# Patient Record
Sex: MALE | Race: WHITE | NOT HISPANIC OR LATINO | Employment: OTHER | URBAN - METROPOLITAN AREA
[De-identification: names, ages, dates, MRNs, and addresses within clinical notes are randomized per-mention and may not be internally consistent; named-entity substitution may affect disease eponyms.]

---

## 2022-12-13 ENCOUNTER — APPOINTMENT (OUTPATIENT)
Dept: GENERAL RADIOLOGY | Facility: CLINIC | Age: 67
DRG: 200 | End: 2022-12-13
Attending: EMERGENCY MEDICINE
Payer: MEDICARE

## 2022-12-13 ENCOUNTER — APPOINTMENT (OUTPATIENT)
Dept: CT IMAGING | Facility: CLINIC | Age: 67
DRG: 200 | End: 2022-12-13
Attending: EMERGENCY MEDICINE
Payer: MEDICARE

## 2022-12-13 ENCOUNTER — HOSPITAL ENCOUNTER (INPATIENT)
Facility: CLINIC | Age: 67
LOS: 2 days | Discharge: HOME | DRG: 200 | End: 2022-12-15
Attending: EMERGENCY MEDICINE | Admitting: HOSPITALIST
Payer: MEDICARE

## 2022-12-13 DIAGNOSIS — S22.41XA CLOSED FRACTURE OF MULTIPLE RIBS OF RIGHT SIDE, INITIAL ENCOUNTER: ICD-10-CM

## 2022-12-13 DIAGNOSIS — J93.9 PNEUMOTHORAX, RIGHT: ICD-10-CM

## 2022-12-13 DIAGNOSIS — Z95.2 H/O MECHANICAL AORTIC VALVE REPLACEMENT: Primary | ICD-10-CM

## 2022-12-13 DIAGNOSIS — J93.9 PNEUMOTHORAX: ICD-10-CM

## 2022-12-13 LAB
ANION GAP SERPL CALCULATED.3IONS-SCNC: 5 MMOL/L (ref 3–14)
BASOPHILS # BLD AUTO: 0 10E3/UL (ref 0–0.2)
BASOPHILS NFR BLD AUTO: 0 %
BUN SERPL-MCNC: 20 MG/DL (ref 7–30)
CALCIUM SERPL-MCNC: 9.2 MG/DL (ref 8.5–10.1)
CHLORIDE BLD-SCNC: 104 MMOL/L (ref 94–109)
CO2 SERPL-SCNC: 27 MMOL/L (ref 20–32)
CREAT SERPL-MCNC: 1.22 MG/DL (ref 0.66–1.25)
EOSINOPHIL # BLD AUTO: 0 10E3/UL (ref 0–0.7)
EOSINOPHIL NFR BLD AUTO: 0 %
ERYTHROCYTE [DISTWIDTH] IN BLOOD BY AUTOMATED COUNT: 12.8 % (ref 10–15)
GFR SERPL CREATININE-BSD FRML MDRD: 65 ML/MIN/1.73M2
GLUCOSE BLD-MCNC: 118 MG/DL (ref 70–99)
GLUCOSE BLDC GLUCOMTR-MCNC: 139 MG/DL (ref 70–99)
HCT VFR BLD AUTO: 43.6 % (ref 40–53)
HGB BLD-MCNC: 14.6 G/DL (ref 13.3–17.7)
IMM GRANULOCYTES # BLD: 0.1 10E3/UL
IMM GRANULOCYTES NFR BLD: 1 %
INR PPP: 1.97 (ref 0.85–1.15)
LYMPHOCYTES # BLD AUTO: 0.7 10E3/UL (ref 0.8–5.3)
LYMPHOCYTES NFR BLD AUTO: 6 %
MCH RBC QN AUTO: 31 PG (ref 26.5–33)
MCHC RBC AUTO-ENTMCNC: 33.5 G/DL (ref 31.5–36.5)
MCV RBC AUTO: 93 FL (ref 78–100)
MONOCYTES # BLD AUTO: 0.6 10E3/UL (ref 0–1.3)
MONOCYTES NFR BLD AUTO: 6 %
NEUTROPHILS # BLD AUTO: 9.3 10E3/UL (ref 1.6–8.3)
NEUTROPHILS NFR BLD AUTO: 87 %
NRBC # BLD AUTO: 0 10E3/UL
NRBC BLD AUTO-RTO: 0 /100
PLATELET # BLD AUTO: 173 10E3/UL (ref 150–450)
POTASSIUM BLD-SCNC: 4.1 MMOL/L (ref 3.4–5.3)
RBC # BLD AUTO: 4.71 10E6/UL (ref 4.4–5.9)
SODIUM SERPL-SCNC: 136 MMOL/L (ref 133–144)
WBC # BLD AUTO: 10.8 10E3/UL (ref 4–11)

## 2022-12-13 PROCEDURE — 0W9930Z DRAINAGE OF RIGHT PLEURAL CAVITY WITH DRAINAGE DEVICE, PERCUTANEOUS APPROACH: ICD-10-PCS | Performed by: EMERGENCY MEDICINE

## 2022-12-13 PROCEDURE — 120N000013 HC R&B IMCU

## 2022-12-13 PROCEDURE — 80048 BASIC METABOLIC PNL TOTAL CA: CPT | Performed by: EMERGENCY MEDICINE

## 2022-12-13 PROCEDURE — 96375 TX/PRO/DX INJ NEW DRUG ADDON: CPT

## 2022-12-13 PROCEDURE — 36415 COLL VENOUS BLD VENIPUNCTURE: CPT | Performed by: EMERGENCY MEDICINE

## 2022-12-13 PROCEDURE — 120N000001 HC R&B MED SURG/OB

## 2022-12-13 PROCEDURE — 71260 CT THORAX DX C+: CPT

## 2022-12-13 PROCEDURE — 250N000011 HC RX IP 250 OP 636: Performed by: HOSPITALIST

## 2022-12-13 PROCEDURE — 71101 X-RAY EXAM UNILAT RIBS/CHEST: CPT | Mod: RT

## 2022-12-13 PROCEDURE — 250N000013 HC RX MED GY IP 250 OP 250 PS 637: Performed by: EMERGENCY MEDICINE

## 2022-12-13 PROCEDURE — 250N000009 HC RX 250: Performed by: EMERGENCY MEDICINE

## 2022-12-13 PROCEDURE — 85004 AUTOMATED DIFF WBC COUNT: CPT | Performed by: EMERGENCY MEDICINE

## 2022-12-13 PROCEDURE — 85610 PROTHROMBIN TIME: CPT | Performed by: EMERGENCY MEDICINE

## 2022-12-13 PROCEDURE — 250N000011 HC RX IP 250 OP 636: Performed by: EMERGENCY MEDICINE

## 2022-12-13 PROCEDURE — 32551 INSERTION OF CHEST TUBE: CPT

## 2022-12-13 PROCEDURE — 250N000013 HC RX MED GY IP 250 OP 250 PS 637: Performed by: HOSPITALIST

## 2022-12-13 PROCEDURE — 96374 THER/PROPH/DIAG INJ IV PUSH: CPT

## 2022-12-13 PROCEDURE — 99222 1ST HOSP IP/OBS MODERATE 55: CPT | Mod: AI | Performed by: HOSPITALIST

## 2022-12-13 PROCEDURE — 99285 EMERGENCY DEPT VISIT HI MDM: CPT | Mod: 25

## 2022-12-13 PROCEDURE — 999N000065 XR CHEST PORT 1 VIEW

## 2022-12-13 RX ORDER — NITROGLYCERIN 0.4 MG/1
0.4 TABLET SUBLINGUAL EVERY 5 MIN PRN
Status: DISCONTINUED | OUTPATIENT
Start: 2022-12-13 | End: 2022-12-15 | Stop reason: HOSPADM

## 2022-12-13 RX ORDER — LIDOCAINE 4 G/G
1 PATCH TOPICAL EVERY 24 HOURS
Status: DISCONTINUED | OUTPATIENT
Start: 2022-12-13 | End: 2022-12-15 | Stop reason: HOSPADM

## 2022-12-13 RX ORDER — ROSUVASTATIN CALCIUM 10 MG/1
10 TABLET, COATED ORAL DAILY
COMMUNITY

## 2022-12-13 RX ORDER — HYDROMORPHONE HYDROCHLORIDE 1 MG/ML
.3-.5 INJECTION, SOLUTION INTRAMUSCULAR; INTRAVENOUS; SUBCUTANEOUS EVERY 4 HOURS PRN
Status: DISCONTINUED | OUTPATIENT
Start: 2022-12-13 | End: 2022-12-15 | Stop reason: HOSPADM

## 2022-12-13 RX ORDER — IOPAMIDOL 755 MG/ML
80 INJECTION, SOLUTION INTRAVASCULAR ONCE
Status: COMPLETED | OUTPATIENT
Start: 2022-12-13 | End: 2022-12-13

## 2022-12-13 RX ORDER — LIDOCAINE 40 MG/G
CREAM TOPICAL
Status: DISCONTINUED | OUTPATIENT
Start: 2022-12-13 | End: 2022-12-13

## 2022-12-13 RX ORDER — LIDOCAINE 40 MG/G
CREAM TOPICAL
Status: DISCONTINUED | OUTPATIENT
Start: 2022-12-13 | End: 2022-12-15 | Stop reason: HOSPADM

## 2022-12-13 RX ORDER — LISINOPRIL 20 MG/1
20 TABLET ORAL DAILY
COMMUNITY

## 2022-12-13 RX ORDER — FENTANYL CITRATE 50 UG/ML
100 INJECTION, SOLUTION INTRAMUSCULAR; INTRAVENOUS ONCE
Status: COMPLETED | OUTPATIENT
Start: 2022-12-13 | End: 2022-12-13

## 2022-12-13 RX ORDER — OXYCODONE HYDROCHLORIDE 5 MG/1
10 TABLET ORAL EVERY 4 HOURS PRN
Status: DISCONTINUED | OUTPATIENT
Start: 2022-12-13 | End: 2022-12-15 | Stop reason: HOSPADM

## 2022-12-13 RX ORDER — FENTANYL CITRATE 50 UG/ML
INJECTION, SOLUTION INTRAMUSCULAR; INTRAVENOUS
Status: DISCONTINUED
Start: 2022-12-13 | End: 2022-12-13 | Stop reason: HOSPADM

## 2022-12-13 RX ORDER — FENTANYL CITRATE 50 UG/ML
INJECTION, SOLUTION INTRAMUSCULAR; INTRAVENOUS DAILY PRN
Status: COMPLETED | OUTPATIENT
Start: 2022-12-13 | End: 2022-12-13

## 2022-12-13 RX ORDER — GABAPENTIN 100 MG/1
100 CAPSULE ORAL 3 TIMES DAILY
Status: DISCONTINUED | OUTPATIENT
Start: 2022-12-13 | End: 2022-12-15 | Stop reason: HOSPADM

## 2022-12-13 RX ORDER — OXYCODONE HYDROCHLORIDE 5 MG/1
5 TABLET ORAL EVERY 4 HOURS PRN
Status: DISCONTINUED | OUTPATIENT
Start: 2022-12-13 | End: 2022-12-15 | Stop reason: HOSPADM

## 2022-12-13 RX ORDER — ROSUVASTATIN CALCIUM 10 MG/1
10 TABLET, COATED ORAL DAILY
Status: DISCONTINUED | OUTPATIENT
Start: 2022-12-14 | End: 2022-12-15 | Stop reason: HOSPADM

## 2022-12-13 RX ORDER — PANTOPRAZOLE SODIUM 40 MG/1
40 TABLET, DELAYED RELEASE ORAL DAILY
Status: DISCONTINUED | OUTPATIENT
Start: 2022-12-14 | End: 2022-12-15 | Stop reason: HOSPADM

## 2022-12-13 RX ORDER — OXYCODONE AND ACETAMINOPHEN 5; 325 MG/1; MG/1
2 TABLET ORAL ONCE
Status: COMPLETED | OUTPATIENT
Start: 2022-12-13 | End: 2022-12-13

## 2022-12-13 RX ORDER — METHOCARBAMOL 500 MG/1
500 TABLET, FILM COATED ORAL EVERY 6 HOURS PRN
Status: DISCONTINUED | OUTPATIENT
Start: 2022-12-13 | End: 2022-12-15 | Stop reason: HOSPADM

## 2022-12-13 RX ORDER — ACETAMINOPHEN 325 MG/1
975 TABLET ORAL EVERY 8 HOURS
Status: DISCONTINUED | OUTPATIENT
Start: 2022-12-13 | End: 2022-12-15 | Stop reason: HOSPADM

## 2022-12-13 RX ADMIN — ACETAMINOPHEN 975 MG: 325 TABLET, FILM COATED ORAL at 17:04

## 2022-12-13 RX ADMIN — HYDROMORPHONE HYDROCHLORIDE 0.5 MG: 1 INJECTION, SOLUTION INTRAMUSCULAR; INTRAVENOUS; SUBCUTANEOUS at 17:03

## 2022-12-13 RX ADMIN — FENTANYL CITRATE 100 MCG: 50 INJECTION, SOLUTION INTRAMUSCULAR; INTRAVENOUS at 13:31

## 2022-12-13 RX ADMIN — GABAPENTIN 100 MG: 100 CAPSULE ORAL at 20:13

## 2022-12-13 RX ADMIN — OXYCODONE HYDROCHLORIDE 10 MG: 5 TABLET ORAL at 21:11

## 2022-12-13 RX ADMIN — IOPAMIDOL 80 ML: 755 INJECTION, SOLUTION INTRAVENOUS at 13:55

## 2022-12-13 RX ADMIN — OXYCODONE HYDROCHLORIDE AND ACETAMINOPHEN 2 TABLET: 5; 325 TABLET ORAL at 15:46

## 2022-12-13 RX ADMIN — MIDAZOLAM HYDROCHLORIDE 1 MG: 1 INJECTION, SOLUTION INTRAMUSCULAR; INTRAVENOUS at 14:12

## 2022-12-13 RX ADMIN — FENTANYL CITRATE 50 MCG: 50 INJECTION, SOLUTION INTRAMUSCULAR; INTRAVENOUS at 14:28

## 2022-12-13 RX ADMIN — SODIUM CHLORIDE 80 ML: 900 INJECTION INTRAVENOUS at 13:55

## 2022-12-13 ASSESSMENT — ACTIVITIES OF DAILY LIVING (ADL)
ADLS_ACUITY_SCORE: 35

## 2022-12-13 ASSESSMENT — ENCOUNTER SYMPTOMS
ARTHRALGIAS: 1
SHORTNESS OF BREATH: 1

## 2022-12-13 NOTE — ED PROVIDER NOTES
"  History   Chief Complaint:  Fall       The history is provided by the patient.      Dutch Mary is a 67 year old male with a history of hypertension, s/p mechanical aortic valve replacement, on warfarin, who presents to the ED following a fall diving for a ball while playing pickle ball. Denies hitting his head or losing consciousness. He reports rib pain, shortness of breath, and soreness throughout his right side, exacerbated with movement.     Review of Systems   Respiratory: Positive for shortness of breath.    Musculoskeletal: Positive for arthralgias (right side).   All other systems reviewed and are negative.      Allergies:  The patient has no known allergies.     Medications:  Lovenox  Metoprolol  Prilosec  Simvastatin  Cialis  Warfarin  Crestor    Past Medical History:     Biceps tendinitis, left  Arthritis of shoulder, left  Depression  Hypertension  Greater trochanteric bursitis of left hip  Primary osteoarthritis of left hip  Mechanical ptosis of eyelid, bilateral  Dissection of aorta  Hyperlipidemia   Atrial fibrillation   Hypertensive kidney disease, stage III    Past Surgical History:    Aortic valve replacement  Colonoscopy  Tonsillectomy  Shoulder cyst surgery  Carpal tunnel release  Repair ptosis bilateral  Vasectomy  Nasal septum surgery     Family History:    Brother- diabetes mellitus, type II  Father- Heart disease  Mother- diabetes mellitus, type II    Social History:  The patient presents to the ED alone by foot.  PCP: Steve Parnell     Physical Exam     Patient Vitals for the past 24 hrs:   BP Temp Temp src Pulse Resp SpO2 Height Weight   12/15/22 0724 132/71 98.4  F (36.9  C) Oral 90 18 95 % -- --   12/15/22 0413 119/73 98.2  F (36.8  C) Oral 53 18 96 % -- 115.6 kg (254 lb 14.4 oz)   12/14/22 1936 121/72 97.8  F (36.6  C) Oral (!) 46 18 95 % -- --   12/14/22 1600 (!) 145/77 98.1  F (36.7  C) Oral 54 27 97 % 1.88 m (6' 2\") --   12/14/22 1200 118/69 -- -- 50 14 96 % -- -- "   12/14/22 1143 -- 97.5  F (36.4  C) Oral -- -- -- -- --   12/14/22 1000 106/58 -- -- 56 18 96 % -- --       Physical Exam  Gen: appears uncomfortable  Oral : Mucous membranes moist,   Nose: No rhinorhea  Ears: External near normal, without drainage  Eyes: periorbital tissues and sclera normal   Neck: supple, no abnormal swelling  Lungs: limited right lung respiratory capacity  CV: Regular rate, regular rhythm  Abd: soft, nontender, nondistended, no rebound/guarding  Ext: no lower extremity edema  Skin: warm, dry, well perfused, no rashes/bruising/lesions on exposed skin  Neuro: alert, no gross motor or sensory deficits,   Psych: pleasant mood, normal affect      Emergency Department Course   ECG  ECG results from 12/13/22   EKG 12-lead, tracing only     Value    Systolic Blood Pressure     Diastolic Blood Pressure     Ventricular Rate 46    Atrial Rate 46    IA Interval 170    QRS Duration 98        QTc 437    P Axis 35    R AXIS 44    T Axis 71    Interpretation ECG      Sinus bradycardia  Otherwise normal ECG  When compared with ECG of 28-NOV-2004 07:44,  No significant change was found         Imaging:  XR Chest 2 Views   Final Result   IMPRESSION: Persistent small right pneumothorax with maximal apical pleural separation of 2 cm. Mild right basilar atelectasis. Left lung clear. Borderline cardiomegaly. Poststernotomy and cardiac valve repair. Normal pulmonary vascularity.      CT Chest w/o Contrast   Final Result   IMPRESSION:    1.  Retraction of right chest tube outside of the pleural space, tip   now within the anterior chest wall.   2.  Recurrent small right pneumothorax without evidence of tension.   3.  Continued evolution of pulmonary contusions in the right upper and   middle lobes.   4.  Small pulmonary nodules, largest measuring 3 mm in the right upper   lobe. Again, recommend follow-up described below.      REFERENCE:   Guidelines for Management of Incidental Pulmonary Nodules Detected on   CT  Images: From the Fleischner Society 2017.    Guidelines apply to incidental nodules in patients who are 35 years or   older.   Guidelines do not apply to lung cancer screening, patients with   immunosuppression, or patients with known primary cancer.      MULTIPLE NODULES   Nodule size <6 mm   Low-risk patients: No follow-up needed.   High-risk patients: Optional follow-up at 12 months.      CHICHO TEJEDA MD            SYSTEM ID:  [id number]      XR Chest Port 1 View   Final Result   IMPRESSION: Recurrence of small right pneumothorax with maximal apical pleural separation of 2.4 cm. Lungs otherwise clear. Stable mild cardiomegaly. Normal pulmonary vascularity. Poststernotomy and cardiac valve repair.      XR Chest Port 1 View   Final Result   IMPRESSION: AP view of the chest was obtained. Postsurgical changes of   cardiac surgery with median sternotomy wires and surgical clips.   Interval placement of right chest tube with significant interval   decrease/almost complete resolution of the previously seen right   pneumothorax. No significant left pleural effusion or pneumothorax.      BRETT SOTO MD            SYSTEM ID:  [id number]      Chest CT w IV contrast only, TRAUMA / DISSECTION   Final Result   IMPRESSION:    1.  Similar volume of moderate right pneumothorax with likely   nondisplaced fractures of the lateral right fourth and fifth ribs.   Minimal leftward mediastinal shift, could indicate element of   early/mild tension.   2.  Likely small peripheral contusions in the right upper and middle   lobes.   3.  Incidental 3 mm right upper lobe pulmonary nodule. Consider   follow-up described below.      REFERENCE:   Guidelines for Management of Incidental Pulmonary Nodules Detected on   CT Images: From the Fleischner Society 2017.    Guidelines apply to incidental nodules in patients who are 35 years or   older.   Guidelines do not apply to lung cancer screening, patients with   immunosuppression, or patients  with known primary cancer.      SINGLE NODULE   Nodule size <6 mm   Low-risk patients: No follow-up needed.   High-risk patients: Optional follow-up at 12 months.      CHICHO TEJEDA MD            SYSTEM ID:  [id number]      Ribs XR, unilat 3 views + PA chest, right   Final Result   IMPRESSION:   1.  Moderate sized right pneumothorax.   2.  No fracture.   3.  No airspace consolidation or pleural effusion.   4.  Sternotomy with aortic valve prosthesis.      Findings discussed with Dr. Cobb by phone at 12:51pm.      CASSANDRA SOTO MD            SYSTEM ID:  [id number]        Report per radiology    Laboratory:  Labs Ordered and Resulted from Time of ED Arrival to Time of ED Departure   BASIC METABOLIC PANEL - Abnormal       Result Value    Sodium 136      Potassium 4.1      Chloride 104      Carbon Dioxide (CO2) 27      Anion Gap 5      Urea Nitrogen 20      Creatinine 1.22      Calcium 9.2      Glucose 118 (*)     GFR Estimate 65     INR - Abnormal    INR 1.97 (*)    CBC WITH PLATELETS AND DIFFERENTIAL - Abnormal    WBC Count 10.8      RBC Count 4.71      Hemoglobin 14.6      Hematocrit 43.6      MCV 93      MCH 31.0      MCHC 33.5      RDW 12.8      Platelet Count 173      % Neutrophils 87      % Lymphocytes 6      % Monocytes 6      % Eosinophils 0      % Basophils 0      % Immature Granulocytes 1      NRBCs per 100 WBC 0      Absolute Neutrophils 9.3 (*)     Absolute Lymphocytes 0.7 (*)     Absolute Monocytes 0.6      Absolute Eosinophils 0.0      Absolute Basophils 0.0      Absolute Immature Granulocytes 0.1      Absolute NRBCs 0.0          Procedures    Tube Thoracostomy Procedure Note     Procedure: Tube Thoracostomy    Indication: Pneumothorax     Laterality: Right    Consent: Verbal from Patient    Risks Discussed: Incorrect placement, bleeding, pain, infection, nerve damage, need for surgery and alternative treatment with no chest tube.    Universal Protocol: Universal protocol was followed and time out  "conducted just prior to starting procedure, confirming patient identity, site/side, procedure, patient position, and availability of correct equipment and implants.     Preparation: Povidone-iodine    Anesthesia/Sedation: Lidocaine - 1%    Procedure Detail:           Standard chest tube: \"Incision was made in the triangle of safety. Blunt dissection was performed up and over the rib and the pleural cavity was entered, confirmed by a rush of air and palpation of the lung surface. A 8 Thai chest tube was placed, fogging of tube observed. Tube was sutured in place and all connections banded.     Catheter was then connected to a one-way valve after manual aspiration of air/Pleur-evac at negative 20 cm H2O pressure.     Results:  There was no/minimal drainage.   Post procedure X-ray showing tube in acceptable position and re-expansion of the lung.     Patient Status: The patient tolerated the procedure well: Yes. There were no complications.    Emergency Department Course:       Reviewed:  I reviewed nursing notes, vitals, past medical history and Care Everywhere    Assessments:  1322 I obtained history and examined the patient as noted above.   1330 I rechecked the patient and explained findings.   1415 I performed the chest tube placement procedure as noted above.  1445 I rechecked the patient following the procedure.    Consults:  1522 I spoke with general surgery regarding the patient.  1525 I spoke with thoracic surgery regarding the patient.  1536 I consulted with Dr. Reed, hospitalist, regarding the patient, and they accepted the patient.    Interventions:  1331 Sublimaze 100 mcg IV    Disposition:  The patient was admitted to the hospital under the care of Dr. Reed.     Impression & Plan     Medical Decision Making:  Patient presents emergency department right-sided rib pain after fall while playing pickle ball today.  Patient feels a bit short of breath and is having pain lateral in his right " chest wall.  Reports prosthetic heart valve and he does take Coumadin.  Chest x-ray done while the patient was sitting in triage demonstrated a moderate sized pneumothorax.  I saw the patient shortly thereafter the x-ray resulted and he is having quite a lot of pain.  Was strongly suspicious of nondisplaced rib fractures given the amount of pain the patient was having.  He was not tachycardic, hypotensive or hypoxic so we did get a quick chest CT which demonstrated the pneumothorax and multiple nondisplaced rib fractures on the right side.  I was able to place an 8 Norwegian chest tube catheter, repeat x-ray does show improvement of the pneumothorax.  Catheter is currently on suction.  Contacted thoracic surgery who will follow up the chest tube placement.  Contacted hospitalist is in agreement for inpatient management.    Critical Care Time: was 35 minutes for this patient excluding procedures    Diagnosis:    ICD-10-CM    1. Closed fracture of multiple ribs of right side, initial encounter  S22.41XA       2. Pneumothorax, right  J93.9           Discharge Medications:  Current Discharge Medication List          Scribe Disclosure:  I, Cristin Kearney, am serving as a scribe at 1:18 PM on 12/13/2022 to document services personally performed by Saqib Marie, based on my observations and the provider's statements to me.          Saqib Marie MD  12/15/22 [id number]

## 2022-12-13 NOTE — H&P
[facility]    History and Physical - Hospitalist Service       Date of Admission:  12/13/2022    Assessment & Plan      Dutch Mary is a 67 year old male admitted on 12/13/2022.       Traumatic right pneumothorax  Nondisplaced fractures of the lateral right fourth and fifth ribs  Right upper and middle lobe lung contusions  Signs of minimal leftward mediastinal shift indicating potentially mild tension on CT prompted chest tube placement in the emergency department after consultation with  thoracic surgery  Plan  - Admit to inpatient to [facility] status given the Chest tube  - Continue chest tube to wall suction  - Consult thoracic surgery (Dr. Hawkins was consulted in the ED)  - Repeat chest x-ray in the morning  - continuous pulse ox  - Pain control per rib fracture order set including gabapentin, Tylenol, oxycodone/IV dilaudid, lidocaine patches      Past medical history of aortic dissection with mechanical aortic valve replacement  INR of 2.0 on admission  Plan  - Resume warfarin when safe from thoracic surgery standpoint  - Noted goal INR per outside records of 2.5-3.5  - Repeat INR daily    3 mm right upper lobe pulmonary nodule  - Refer to PCP for follow-up    Chronic medical conditions  GERD  Hyperlipidemia  Hypertension  Plan  - Resume medications as appropriate      Diet:  Regular  DVT Prophylaxis: holding warfarin (INR 2.0, received 10 mg of warfarin at home)  Cano Catheter: Not present  Central Lines: None  Cardiac Monitoring: None  Code Status:   full code    Clinically Significant Risk Factors Present on Admission               # Drug Induced Coagulation Defect: home medication list includes an anticoagulant medication                 Disposition Plan      Expected Discharge Date: 12/15/2022                The patient's care was discussed with the Patient.    Yovani Reed DO  Hospitalist Service  [facility]  Securely message with the [employer]  Web Console (learn more here)  Text page via [facility] Paging/Directory         ______________________________________________________________________    Chief Complaint   Chest pain    History is obtained from the patient    History of Present Illness   Dutch Mary is a 67 year old male with past medical history of mechanical aortic valve replacement, aortic dissection who presents with chest pain after diving during a game of [employer] ball.  He did not lose consciousness or hit his head.  He has pain in his chest and some shortness of breath.  Chest x-ray the emergency department revealed a moderate sized pneumothorax.  CT scan raise concern about small amount of mediastinal shift concerning for early tension.  Thoracic surgery was consulted and recommended chest tube placement.    Follow-up chest tube noted significant decrease/almost complete resolution of the previous seen right pneumothorax.    The patient currently notes increasing pain as the fentanyl from the chest tube insertion is wearing off.  He does not feel worse than previous.  He still feels somewhat short of breath but this is not worse.        Review of Systems    The 10 point Review of Systems is negative other than noted in the HPI or here.     Past Medical History    I have reviewed this patient's medical history and updated it with pertinent information if needed.   Past Medical History:   Diagnosis Date     Arrhythmia     atrail fib.     GERD (gastroesophageal reflux disease)      H/O vasectomy      Heart murmur     aortic valve replacement     Hyperlipidemia      Hypertension        Past Surgical History   I have reviewed this patient's surgical history and updated it with pertinent information if needed.  Past Surgical History:   Procedure Laterality Date     CARDIAC SURGERY      AVR     COLONOSCOPY       ENT SURGERY      tonsillectomy, nasal septal surgery     GENITOURINARY SURGERY       ORTHOPEDIC SURGERY      shoulder cyst, Carpal  tunnel release     REPAIR PTOSIS BILATERAL Bilateral 8/30/2016    Procedure: REPAIR PTOSIS BILATERAL;  Surgeon: Yovani Ornelas MD;  Location: [facility]     REPAIR PTOSIS BROW BILATERAL Bilateral 8/30/2016    Procedure: REPAIR PTOSIS BROW BILATERAL;  Surgeon: Yovani Ornelas MD;  Location: [facility]       Social History   I have reviewed this patient's social history and updated it with pertinent information if needed.  Social History     Tobacco Use     Smoking status: Former   Substance Use Topics     Alcohol use: Yes     Comment: 4/week     Drug use: No       Family History   I have reviewed this patient's family history and updated it with pertinent information if needed.  Family History   Problem Relation Age of Onset     Heart Failure Father        Prior to Admission Medications   Prior to Admission Medications   Prescriptions Last Dose Informant Patient Reported? Taking?   Enoxaparin Sodium (LOVENOX SC)   Yes No   METOPROLOL SUCCINATE ER PO   Yes No   Sig: Take 25 mg by mouth daily   Omeprazole Magnesium (PRILOSEC OTC PO)   Yes No   Sig: Take 20 mg by mouth daily   SIMVASTATIN PO   Yes No   Sig: Take 20 mg by mouth At Bedtime   Tadalafil (CIALIS PO)   Yes No   Sig: Take 20 mg by mouth daily as needed for erectile dysfunction   WARFARIN SODIUM PO   Yes No   Sig: Take 10 mg by mouth daily       Facility-Administered Medications: None     Allergies   No Known Allergies    Physical Exam   Vital Signs: Temp: 98  F (36.7  C) Temp src: Oral BP: 130/81 Pulse: 53   Resp: 20 SpO2: 100 % O2 Device: Nasal cannula Oxygen Delivery: 2 LPM  Weight: 250 lbs 0 oz    Gen: appears uncomfortable and in pain  Oral : Mucous membranes moist,   Nose: No rhinorhea  Ears: External near normal, without drainage  Eyes: periorbital tissues and sclera normal   Neck: supple, no abnormal swelling  Lungs: Right chest tube in place with air bubbles during inspiration. WOB WNL on 2 liters of NC. Lung CTAB  CV: Regular rate, regular rhythm.  Mechanical heart clic  Abd: soft, nontender, nondistended, no rebound/guarding  Ext: no lower extremity edema  Skin: warm, dry, well perfused, no rashes/bruising/lesions on exposed skin  Neuro: alert, no gross motor or sensory deficits,   Psych: pleasant mood, normal affect    Data   Data reviewed today: I reviewed all medications, new labs and imaging results over the last 24 hours. I personally reviewed   CT chest with moderate ptx questionable mediastinal shift. CXR with close to resolution of the PTX    Recent Labs   Lab 12/13/22  1447   WBC 10.8   HGB 14.6   MCV 93      INR 1.97*      POTASSIUM 4.1   CHLORIDE 104   CO2 27   BUN 20   CR 1.22   ANIONGAP 5   TANNER 9.2   *     Most Recent 3 CBC's:Recent Labs   Lab Test 12/13/22  1447   WBC 10.8   HGB 14.6   MCV 93        Most Recent 3 BMP's:Recent Labs   Lab Test 12/13/22  1447      POTASSIUM 4.1   CHLORIDE 104   CO2 27   BUN 20   CR 1.22   ANIONGAP 5   TANNER 9.2   *     Most Recent 2 LFT's:No lab results found.

## 2022-12-13 NOTE — ED NOTES
[facility]  ED Nurse Handoff Report    ED Chief complaint: Fall      ED Diagnosis:   Final diagnoses:   Closed fracture of multiple ribs of right side, initial encounter   Pneumothorax, right       Code Status: MD to Address    Allergies: No Known Allergies    Patient Story: Pt presents with rib pain and SOB following a fall during pickle ball. Pt has a hx of HTN and aortic valve replacement. Chest X-ray shows R pneumothorax. Chest tube placed.   Focused Assessment:  Aox4. VSS on 2L NC. Reg diet. Ambulate with assist.     Treatments and/or interventions provided: chest x-ray. Pain medications   Labs Ordered and Resulted from Time of ED Arrival to Time of ED Departure   BASIC METABOLIC PANEL - Abnormal       Result Value    Sodium 136      Potassium 4.1      Chloride 104      Carbon Dioxide (CO2) 27      Anion Gap 5      Urea Nitrogen 20      Creatinine 1.22      Calcium 9.2      Glucose 118 (*)     GFR Estimate 65     INR - Abnormal    INR 1.97 (*)    CBC WITH PLATELETS AND DIFFERENTIAL - Abnormal    WBC Count 10.8      RBC Count 4.71      Hemoglobin 14.6      Hematocrit 43.6      MCV 93      MCH 31.0      MCHC 33.5      RDW 12.8      Platelet Count 173      % Neutrophils 87      % Lymphocytes 6      % Monocytes 6      % Eosinophils 0      % Basophils 0      % Immature Granulocytes 1      NRBCs per 100 WBC 0      Absolute Neutrophils 9.3 (*)     Absolute Lymphocytes 0.7 (*)     Absolute Monocytes 0.6      Absolute Eosinophils 0.0      Absolute Basophils 0.0      Absolute Immature Granulocytes 0.1      Absolute NRBCs 0.0       Patient's response to treatments and/or interventions: improving     To be done/followed up on inpatient unit:  thoracic consult     Does this patient have any cognitive concerns?: aox4    Activity level - Baseline/Home:  Independent  Activity Level - Current:   Stand with Assist    Patient's Preferred language: English   Needed?: No    Isolation: None  Infection:  Not Applicable  Patient tested for COVID 19 prior to admission: NO  Bariatric?: No    Vital Signs:   Vitals:    12/13/22 1445 12/13/22 1446 12/13/22 1530 12/13/22 1545   BP: (!) 89/74 106/65 106/71 130/81   Pulse: 54 72 54 53   Resp:   19 20   Temp:       TempSrc:       SpO2:  98% 99% 100%   Weight:           Cardiac Rhythm:     Was the PSS-3 completed:   Yes  What interventions are required if any?               Family Comments: pt updating family   OBS brochure/video discussed/provided to patient/family: N/A              Name of person given brochure if not patient: n/a              Relationship to patient: n/a    For the majority of the shift this patient's behavior was Green.   Behavioral interventions performed were n/a.    ED NURSE PHONE NUMBER: [phone]

## 2022-12-13 NOTE — PHARMACY-ADMISSION MEDICATION HISTORY
Pharmacy Medication History  Admission medication history interview status for the 12/13/2022  admission is complete. See EPIC admission navigator for prior to admission medications     Location of Interview: Patient room  Medication history sources: Patient    Significant changes made to the medication list:  Removed: Metoprolol, enoxaparin, simvastatin  Added: Lisinopril, Crestor.    In the past week, patient estimated taking medication this percent of the time: greater than 90%    Additional medication history information:   None    Medication reconciliation completed by provider prior to medication history? No    Time spent in this activity: 15 min    Prior to Admission medications    Medication Sig Last Dose Taking? Auth Provider Long Term End Date   lisinopril (ZESTRIL) 20 MG tablet Take 20 mg by mouth daily 12/13/2022 Yes Unknown, Entered By History Yes    Omeprazole Magnesium (PRILOSEC OTC PO) Take 20 mg by mouth daily 12/13/2022 Yes Reported, Patient     rosuvastatin (CRESTOR) 10 MG tablet Take 10 mg by mouth daily 12/13/2022 Yes Unknown, Entered By History Yes    WARFARIN SODIUM PO Take by mouth See Admin Instructions 10mg daily except 15mg on Tues and Sat 12/13/2022 at 10mg Yes Reported, Patient     Tadalafil (CIALIS PO) Take 20 mg by mouth daily as needed for erectile dysfunction   Reported, Patient Yes        The information provided in this note is only as accurate as the sources available at the time of update(s)

## 2022-12-13 NOTE — ED TRIAGE NOTES
Patient fell while playing pickleball today and developed sudden rib pain and shortness of breath.      Triage Assessment     Row Name 12/13/22 1214       Triage Assessment (Adult)    Airway WDL WDL       Respiratory WDL    Respiratory WDL X       Skin Circulation/Temperature WDL    Skin Circulation/Temperature WDL WDL       Cardiac WDL    Cardiac WDL WDL       Peripheral/Neurovascular WDL    Peripheral Neurovascular WDL WDL       Cognitive/Neuro/Behavioral WDL    Cognitive/Neuro/Behavioral WDL WDL

## 2022-12-14 ENCOUNTER — APPOINTMENT (OUTPATIENT)
Dept: CT IMAGING | Facility: CLINIC | Age: 67
DRG: 200 | End: 2022-12-14
Attending: PHYSICIAN ASSISTANT
Payer: MEDICARE

## 2022-12-14 ENCOUNTER — APPOINTMENT (OUTPATIENT)
Dept: GENERAL RADIOLOGY | Facility: CLINIC | Age: 67
DRG: 200 | End: 2022-12-14
Attending: THORACIC SURGERY (CARDIOTHORACIC VASCULAR SURGERY)
Payer: MEDICARE

## 2022-12-14 LAB
ALBUMIN SERPL-MCNC: 3.4 G/DL (ref 3.4–5)
ALP SERPL-CCNC: 71 U/L (ref 40–150)
ALT SERPL W P-5'-P-CCNC: 40 U/L (ref 0–70)
ANION GAP SERPL CALCULATED.3IONS-SCNC: 6 MMOL/L (ref 3–14)
AST SERPL W P-5'-P-CCNC: 26 U/L (ref 0–45)
BASOPHILS # BLD AUTO: 0 10E3/UL (ref 0–0.2)
BASOPHILS NFR BLD AUTO: 1 %
BILIRUB SERPL-MCNC: 1.2 MG/DL (ref 0.2–1.3)
BUN SERPL-MCNC: 24 MG/DL (ref 7–30)
CALCIUM SERPL-MCNC: 8.7 MG/DL (ref 8.5–10.1)
CHLORIDE BLD-SCNC: 105 MMOL/L (ref 94–109)
CO2 SERPL-SCNC: 26 MMOL/L (ref 20–32)
CREAT SERPL-MCNC: 1.04 MG/DL (ref 0.66–1.25)
EOSINOPHIL # BLD AUTO: 0.2 10E3/UL (ref 0–0.7)
EOSINOPHIL NFR BLD AUTO: 3 %
ERYTHROCYTE [DISTWIDTH] IN BLOOD BY AUTOMATED COUNT: 13 % (ref 10–15)
GFR SERPL CREATININE-BSD FRML MDRD: 79 ML/MIN/1.73M2
GLUCOSE BLD-MCNC: 113 MG/DL (ref 70–99)
HCT VFR BLD AUTO: 40.4 % (ref 40–53)
HGB BLD-MCNC: 13.6 G/DL (ref 13.3–17.7)
IMM GRANULOCYTES # BLD: 0 10E3/UL
IMM GRANULOCYTES NFR BLD: 0 %
INR PPP: 2.09 (ref 0.85–1.15)
LYMPHOCYTES # BLD AUTO: 1.3 10E3/UL (ref 0.8–5.3)
LYMPHOCYTES NFR BLD AUTO: 20 %
MAGNESIUM SERPL-MCNC: 2.1 MG/DL (ref 1.6–2.3)
MCH RBC QN AUTO: 31 PG (ref 26.5–33)
MCHC RBC AUTO-ENTMCNC: 33.7 G/DL (ref 31.5–36.5)
MCV RBC AUTO: 92 FL (ref 78–100)
MONOCYTES # BLD AUTO: 0.7 10E3/UL (ref 0–1.3)
MONOCYTES NFR BLD AUTO: 11 %
NEUTROPHILS # BLD AUTO: 4.3 10E3/UL (ref 1.6–8.3)
NEUTROPHILS NFR BLD AUTO: 65 %
NRBC # BLD AUTO: 0 10E3/UL
NRBC BLD AUTO-RTO: 0 /100
PHOSPHATE SERPL-MCNC: 2.9 MG/DL (ref 2.5–4.5)
PLATELET # BLD AUTO: 157 10E3/UL (ref 150–450)
POTASSIUM BLD-SCNC: 3.6 MMOL/L (ref 3.4–5.3)
PROT SERPL-MCNC: 6.4 G/DL (ref 6.8–8.8)
RBC # BLD AUTO: 4.39 10E6/UL (ref 4.4–5.9)
SODIUM SERPL-SCNC: 137 MMOL/L (ref 133–144)
WBC # BLD AUTO: 6.6 10E3/UL (ref 4–11)

## 2022-12-14 PROCEDURE — 71250 CT THORAX DX C-: CPT | Mod: MG

## 2022-12-14 PROCEDURE — 85025 COMPLETE CBC W/AUTO DIFF WBC: CPT | Performed by: HOSPITALIST

## 2022-12-14 PROCEDURE — 85610 PROTHROMBIN TIME: CPT | Performed by: PHYSICIAN ASSISTANT

## 2022-12-14 PROCEDURE — 71045 X-RAY EXAM CHEST 1 VIEW: CPT

## 2022-12-14 PROCEDURE — 36415 COLL VENOUS BLD VENIPUNCTURE: CPT | Performed by: PHYSICIAN ASSISTANT

## 2022-12-14 PROCEDURE — 80053 COMPREHEN METABOLIC PANEL: CPT | Performed by: HOSPITALIST

## 2022-12-14 PROCEDURE — 93010 ELECTROCARDIOGRAM REPORT: CPT | Performed by: INTERNAL MEDICINE

## 2022-12-14 PROCEDURE — 250N000011 HC RX IP 250 OP 636: Performed by: PHYSICIAN ASSISTANT

## 2022-12-14 PROCEDURE — 99233 SBSQ HOSP IP/OBS HIGH 50: CPT | Performed by: PHYSICIAN ASSISTANT

## 2022-12-14 PROCEDURE — 250N000013 HC RX MED GY IP 250 OP 250 PS 637: Performed by: HOSPITALIST

## 2022-12-14 PROCEDURE — 93005 ELECTROCARDIOGRAM TRACING: CPT

## 2022-12-14 PROCEDURE — G1010 CDSM STANSON: HCPCS

## 2022-12-14 PROCEDURE — 36415 COLL VENOUS BLD VENIPUNCTURE: CPT | Performed by: HOSPITALIST

## 2022-12-14 PROCEDURE — 250N000009 HC RX 250: Performed by: PHYSICIAN ASSISTANT

## 2022-12-14 PROCEDURE — 84100 ASSAY OF PHOSPHORUS: CPT | Performed by: HOSPITALIST

## 2022-12-14 PROCEDURE — 250N000011 HC RX IP 250 OP 636: Performed by: HOSPITALIST

## 2022-12-14 PROCEDURE — 83735 ASSAY OF MAGNESIUM: CPT | Performed by: HOSPITALIST

## 2022-12-14 PROCEDURE — 120N000013 HC R&B IMCU

## 2022-12-14 PROCEDURE — 120N000001 HC R&B MED SURG/OB

## 2022-12-14 RX ORDER — NALOXONE HYDROCHLORIDE 0.4 MG/ML
0.2 INJECTION, SOLUTION INTRAMUSCULAR; INTRAVENOUS; SUBCUTANEOUS
Status: DISCONTINUED | OUTPATIENT
Start: 2022-12-14 | End: 2022-12-15 | Stop reason: HOSPADM

## 2022-12-14 RX ORDER — NALOXONE HYDROCHLORIDE 0.4 MG/ML
0.4 INJECTION, SOLUTION INTRAMUSCULAR; INTRAVENOUS; SUBCUTANEOUS
Status: DISCONTINUED | OUTPATIENT
Start: 2022-12-14 | End: 2022-12-15 | Stop reason: HOSPADM

## 2022-12-14 RX ORDER — WARFARIN SODIUM 7.5 MG/1
15 TABLET ORAL
Status: COMPLETED | OUTPATIENT
Start: 2022-12-14 | End: 2022-12-14

## 2022-12-14 RX ORDER — GINSENG 100 MG
CAPSULE ORAL ONCE
Status: COMPLETED | OUTPATIENT
Start: 2022-12-14 | End: 2022-12-14

## 2022-12-14 RX ORDER — DOCUSATE SODIUM 100 MG/1
100 CAPSULE, LIQUID FILLED ORAL 2 TIMES DAILY PRN
Status: DISCONTINUED | OUTPATIENT
Start: 2022-12-14 | End: 2022-12-15 | Stop reason: HOSPADM

## 2022-12-14 RX ORDER — ENOXAPARIN SODIUM 150 MG/ML
1 INJECTION SUBCUTANEOUS EVERY 12 HOURS
Status: DISCONTINUED | OUTPATIENT
Start: 2022-12-14 | End: 2022-12-15 | Stop reason: HOSPADM

## 2022-12-14 RX ADMIN — ROSUVASTATIN CALCIUM 10 MG: 10 TABLET, FILM COATED ORAL at 08:44

## 2022-12-14 RX ADMIN — PANTOPRAZOLE SODIUM 40 MG: 40 TABLET, DELAYED RELEASE ORAL at 08:44

## 2022-12-14 RX ADMIN — WARFARIN SODIUM 15 MG: 7.5 TABLET ORAL at 17:48

## 2022-12-14 RX ADMIN — ACETAMINOPHEN 975 MG: 325 TABLET, FILM COATED ORAL at 00:23

## 2022-12-14 RX ADMIN — GABAPENTIN 100 MG: 100 CAPSULE ORAL at 19:18

## 2022-12-14 RX ADMIN — OXYCODONE HYDROCHLORIDE 10 MG: 5 TABLET ORAL at 17:54

## 2022-12-14 RX ADMIN — GABAPENTIN 100 MG: 100 CAPSULE ORAL at 08:44

## 2022-12-14 RX ADMIN — OXYCODONE HYDROCHLORIDE 10 MG: 5 TABLET ORAL at 23:43

## 2022-12-14 RX ADMIN — BACITRACIN: 500 OINTMENT TOPICAL at 15:58

## 2022-12-14 RX ADMIN — ACETAMINOPHEN 975 MG: 325 TABLET, FILM COATED ORAL at 17:48

## 2022-12-14 RX ADMIN — LIDOCAINE 1 PATCH: 560 PATCH PERCUTANEOUS; TOPICAL; TRANSDERMAL at 17:50

## 2022-12-14 RX ADMIN — OXYCODONE HYDROCHLORIDE 10 MG: 5 TABLET ORAL at 10:26

## 2022-12-14 RX ADMIN — ENOXAPARIN SODIUM 120 MG: 120 INJECTION SUBCUTANEOUS at 19:18

## 2022-12-14 RX ADMIN — OXYCODONE HYDROCHLORIDE 5 MG: 5 TABLET ORAL at 05:19

## 2022-12-14 RX ADMIN — GABAPENTIN 100 MG: 100 CAPSULE ORAL at 13:33

## 2022-12-14 RX ADMIN — ACETAMINOPHEN 975 MG: 325 TABLET, FILM COATED ORAL at 08:44

## 2022-12-14 RX ADMIN — HYDROMORPHONE HYDROCHLORIDE 0.5 MG: 1 INJECTION, SOLUTION INTRAMUSCULAR; INTRAVENOUS; SUBCUTANEOUS at 13:33

## 2022-12-14 ASSESSMENT — ACTIVITIES OF DAILY LIVING (ADL)
ADLS_ACUITY_SCORE: 35

## 2022-12-14 NOTE — PLAN OF CARE
Goal Outcome Evaluation:      Plan of Care Reviewed With: patient    Overall Patient Progress: improvingOverall Patient Progress: improving    IMC discontinued. VSS, bradycardic. Chest tube was removed this afternoon by thoracic PA. Dressing CDI. IS 2500ml. Up independently in room. Pain managed with PRN oxycodone. Warfarin restarted, lovenox to bridge since subtherapeutic. Plan for CXR in AM and then possible discharge tomorrow. CTM.

## 2022-12-14 NOTE — PROVIDER NOTIFICATION
MD Notification    Notified Person: MD    Notified Person Name: Valentino Live    Notification Date/Time: 12/14/22 0010    Notification Interaction: text page    Purpose of Notification:Pt just transferred to floor,now has a small continuous air leak in chest tube-has slightly decreased since starting.No increase in SOB. No crepitus or chest pain.All tubes connected to suction    Orders Received:    Comments: defer to thoracic team. Charge RN notified, ok to monitor overnight-no change in RR, SOB or pain. Air leak bubbles starting to slow down. Chest tube banded along connection sections.

## 2022-12-14 NOTE — PROGRESS NOTES
THORACIC SURGERY PROGRESS NOTE    Reviewed CT scan. Chest tube pulled out of the pleural space. Small right pneumothorax.     Removed CT at the bedside, occlusive dressing applied.     Plan to observe patient overnight. Will order CXR PA/lat in AM. If okay, then discharge to home and follow up in clinic next Monday.    Ruth Ann Bassett PA-C with Dr. Kiel Hawkins  [facility]  Office: [phone]  Cell: [phone]

## 2022-12-14 NOTE — PLAN OF CARE
Goal Outcome Evaluation:         Up to floor at 2340. Alert and oriented x4. Vital signs stable on RA ex bradycardic. Assist of 1, pivot transfer. Tolerating regular diet. Lung sounds dim, clear. - flatus, BM -. Adequate urine output. Sun burn throughout body. CT to R side, CDI, to -20 suction. Continuous airleak noted, no increase in SOB, chest pain or O2 requirements. No crepitus noted. Pain managed with oxycodone and scheduled tylenol. Denies nausea. Tele SB.

## 2022-12-14 NOTE — PROGRESS NOTES
[facility]    Hospitalist Progress Note      Assessment & Plan    Dutch Mary is a 67 year old male with a past medical history significant for mechanical aortic valve replacement, HTN, HLD, and aortic dissection admitted on 12/13/2022 after presenting with chest pain and shortness of breath following an injury while playing pickle ball.     Traumatic right pneumothorax  Nondisplaced fractures of the lateral right fourth and fifth ribs  Right upper and middle lobe lung contusions  Signs of minimal leftward mediastinal shift indicating potentially mild tension on CT prompted chest tube placement in the emergency department after consultation with  thoracic surgery  Chest tube placed in the ED  * CXR 12/14 raises some concern about chest tube placement  - Continue chest tube to wall suction  - Thoracic surgery following, appreciate assistance  -- plan for CT chest this afternoon to confirm placement, may need IR intervention if dislodged (IR aware)  - continuous pulse ox  - Pain control per rib fracture order set including gabapentin, Tylenol, oxycodone/IV dilaudid, lidocaine patches  --aggressive IS, pulmonary hygiene      Mechanical aortic valve replacement  Hx aortic dissection    INR on admission 1.97, 2.0 12/14.   * goal INR per outside records of 2.5-3.5  --plan to initiate bridge following CT results/plan this afternoon. Awaiting thoracic surgery preference in regards to lovenox vs heparin and timing of initiation  --continue to hold warfarin for now, appreciate thoracic input   --daily INR      3 mm right upper lobe pulmonary nodule  - Refer to PCP for follow-up     Chronic medical conditions  GERD  Hyperlipidemia  Hypertension  Plan  - Resume PTA statin, PPI  --resume PTA lisinopril in the am with hold parameters pending BP    DVT Prophylaxis: Pneumatic Compression Devices, will need bridge pending CT findings as above   Code Status: Full Code    Disposition: Expected  discharge in 2 days pending clinical course    Patient was discussed with Dr. Brown who agrees with the above plan     Mary Collazo PA-C, [facility]    Interval History   Feeling better today. TPain controlled with medications and more localized to tube insertion site; no longer having pain with deep inspiration. Denies dyspnea. Still very uncomfortable with significant movement.     -Data reviewed today: I reviewed all new labs and imaging results over the last 24 hours. I personally reviewed no images or EKG's today.    Physical Exam   Temp: 98.2  F (36.8  C) Temp src: Oral BP: 127/76 Pulse: (!) 46   Resp: 17 SpO2: 98 % O2 Device: None (Room air) Oxygen Delivery: 2 LPM  Vitals:    12/13/22 1218 12/14/22 0500   Weight: 113.4 kg (250 lb) 115.3 kg (254 lb 3.2 oz)     Vital Signs with Ranges  Temp:  [97.3  F (36.3  C)-98.2  F (36.8  C)] 98.2  F (36.8  C)  Pulse:  [46-72] 46  Resp:  [11-31] 17  BP: ()/(59-95) 127/76  SpO2:  [94 %-100 %] 98 %  I/O last 3 completed shifts:  In: 240 [P.O.:240]  Out: 300 [Urine:300]    Constitutional: Alert and oriented, sitting up in bed. Appears comfortable and is appropriately conversant   ENT: moist mucous membranes. Trachea midline  Respiratory: Lungs diminished right base otherwise clear without increased work of breathing. CT in place on right   Cardiovascular: Regular rate and rhythm, mechanical valve click  GI: active bowel sounds, abdomen soft, non-tender  Skin/Integumen: no ecchymosis seen  MSK: moves all four extremities. Normal tone   Neuro:  Speech is clear. Face symmetric.follows commands     Medications       acetaminophen  975 mg Oral Q8H     gabapentin  100 mg Oral TID     lidocaine  1 patch Transdermal Q24H     lidocaine   Transdermal Q8H GAEL     pantoprazole  40 mg Oral Daily     rosuvastatin  10 mg Oral Daily     sodium chloride (PF)  3 mL Intracatheter Q8H       Data   Recent Labs   Lab 12/14/22  0535 12/13/22  2348 12/13/22  1447   WBC 6.6  --  10.8   HGB  13.6  --  14.6   MCV 92  --  93     --  173   INR  --   --  1.97*     --  136   POTASSIUM 3.6  --  4.1   CHLORIDE 105  --  104   CO2 26  --  27   BUN 24  --  20   CR 1.04  --  1.22   ANIONGAP 6  --  5   TANNER 8.7  --  9.2   * 139* 118*   ALBUMIN 3.4  --   --    PROTTOTAL 6.4*  --   --    BILITOTAL 1.2  --   --    ALKPHOS 71  --   --    ALT 40  --   --    AST 26  --   --        Recent Results (from the past 24 hour(s))   Ribs XR, unilat 3 views + PA chest, right    Narrative    RIBS AND CHEST RIGHT THREE VIEWS December 13, 2022 12:33 PM     INDICATION: Right-sided rib pain after a fall.     COMPARISON: 11/29/2004.      Impression    IMPRESSION:  1.  Moderate sized right pneumothorax.  2.  No fracture.  3.  No airspace consolidation or pleural effusion.  4.  Sternotomy with aortic valve prosthesis.    Findings discussed with Dr. Cobb by phone at 12:51pm.    CASSANDRA SOTO MD         SYSTEM ID:  [id number]   Chest CT w IV contrast only, TRAUMA / DISSECTION    Narrative    CT CHEST W CONTRAST 12/13/2022 2:10 PM    CLINICAL HISTORY: eval for rib fx, known pneumo  TECHNIQUE: CT chest with IV contrast. Multiplanar reformats were  obtained. Dose reduction techniques were used.    CONTRAST: 80mL Isovue-370    COMPARISON: Same-day chest radiograph.    FINDINGS:   LUNGS AND PLEURA: Grossly unchanged size of known moderate right  pneumothorax. Small peripheral contusions in the right lower and  middle lobes (for example series 4 images 104 and 163). No significant  pleural effusion. Left lung is clear. Incidental 3 mm nodule in the  right upper lobe (series 4 image 101).    MEDIASTINUM/AXILLAE: No lymphadenopathy. No thoracic aneurysm. Prior  aortic valve replacement. Possible minimal leftward mediastinal shift.      CORONARY ARTERY CALCIFICATION: Mild.    UPPER ABDOMEN: Cholelithiasis without evidence of acute cholecystitis.    MUSCULOSKELETAL: Likely nondisplaced fractures of the lateral right  fourth and  fifth ribs. Healed left sixth rib fracture. Prior median  sternotomy.        Impression    IMPRESSION:   1.  Similar volume of moderate right pneumothorax with likely  nondisplaced fractures of the lateral right fourth and fifth ribs.  Minimal leftward mediastinal shift, could indicate element of  early/mild tension.  2.  Likely small peripheral contusions in the right upper and middle  lobes.  3.  Incidental 3 mm right upper lobe pulmonary nodule. Consider  follow-up described below.    REFERENCE:  Guidelines for Management of Incidental Pulmonary Nodules Detected on  CT Images: From the Fleischner Society 2017.   Guidelines apply to incidental nodules in patients who are 35 years or  older.  Guidelines do not apply to lung cancer screening, patients with  immunosuppression, or patients with known primary cancer.    SINGLE NODULE  Nodule size <6 mm  Low-risk patients: No follow-up needed.  High-risk patients: Optional follow-up at 12 months.    CHICHO TEJEDA MD         SYSTEM ID:  [id number]   XR Chest Port 1 View    Narrative    CHEST PORTABLE 1 VIEW   12/13/2022 2:48 PM     HISTORY: Evaluate CT.    COMPARISON: Chest CT on same day earlier.      Impression    IMPRESSION: AP view of the chest was obtained. Postsurgical changes of  cardiac surgery with median sternotomy wires and surgical clips.  Interval placement of right chest tube with significant interval  decrease/almost complete resolution of the previously seen right  pneumothorax. No significant left pleural effusion or pneumothorax.    BRETT SOTO MD         SYSTEM ID:  [id number]   XR Chest Port 1 View    Narrative    EXAM: XR CHEST PORT 1 VIEW  LOCATION: [facility]  DATE/TIME: 12/14/2022 6:05 AM    INDICATION: Surveillance of pneumothorax.  COMPARISON: 12/13/2022.      Impression    IMPRESSION: Recurrence of small right pneumothorax with maximal apical pleural separation of 2.4 cm. Lungs otherwise clear. Stable mild  cardiomegaly. Normal pulmonary vascularity. Poststernotomy and cardiac valve repair.

## 2022-12-14 NOTE — PHARMACY-ANTICOAGULATION SERVICE
Clinical Pharmacy - Warfarin Dosing Consult     Pharmacy has been consulted to manage this patient s warfarin therapy.  Indication: Mechanical Aortic Valve Replacement  Therapy Goal: INR 2.5-3.5  Warfarin Prior to Admission: Yes  Warfarin PTA Regimen: 15 mg Tue/Sat, 10 mg rest of the week    INR   Date Value Ref Range Status   12/14/2022 2.09 (H) 0.85 - 1.15 Final   12/13/2022 1.97 (H) 0.85 - 1.15 Final       Recommend warfarin 15 mg today.  Pharmacy will monitor Dutch Mary daily and order warfarin doses to achieve specified goal.      Please contact pharmacy as soon as possible if the warfarin needs to be held for a procedure or if the warfarin goals change.

## 2022-12-14 NOTE — CONSULTS
THORACIC SURGERY CONSULTATION    Dutch Mary  1955   [id number]    Date of Admission: 12/13/2022  1:15 PM  Date of Consult: 12/14/2022     Referring Provider: Dr. Yovani Reed  Consulting Thoracic Surgeon: Dr. Kiel Hawkins    CC: Pneumothorax, right [J93.9]  Closed fracture of multiple ribs of right side, initial encounter [S22.41XA]    History of the Present Illness: Dutch Mary is a 67-year-old male with history of hypertension, hyperlipidemia aortic dissection s/p aortic valve replacement and GERD. He was playing [employer] ball yesterday and fell while diving for the ball. He developed chest pain and shortness of breath. He presented to the ED yesterday evening. There, CXR demonstrated moderated sized right pneumothorax. CT scan of the chest again demonstrated right pneumothorax and concern for mediastinal shift. He underwent chest tube placement in the ED. CXR post CT placement demonstrated good improvement of the right pneumothorax. Thoracic surgery consulted for management of chest tube and right pneumothorax.    Today, he reports his breathing is improved and chest pain localized to the chest tube insertion site. He is on room air. This is the first occurrence of a pneumothorax.       ROS: A 12-point review of systems was performed and negative except as noted in the HPI above.    Past Medical History:  Past Medical History:   Diagnosis Date     Arrhythmia     atrail fib.     GERD (gastroesophageal reflux disease)      H/O vasectomy      Heart murmur     aortic valve replacement     Hyperlipidemia      Hypertension         Past Surgical History:  Past Surgical History:   Procedure Laterality Date     CARDIAC SURGERY      AVR     COLONOSCOPY       ENT SURGERY      tonsillectomy, nasal septal surgery     GENITOURINARY SURGERY       ORTHOPEDIC SURGERY      shoulder cyst, Carpal tunnel release     REPAIR PTOSIS BILATERAL Bilateral 8/30/2016    Procedure: REPAIR PTOSIS BILATERAL;  Surgeon: Johnson  Yovani Ta MD;  Location: [facility]     REPAIR PTOSIS BROW BILATERAL Bilateral 8/30/2016    Procedure: REPAIR PTOSIS BROW BILATERAL;  Surgeon: Yovani Ornelas MD;  Location: [facility]       Family History:  Family History   Problem Relation Age of Onset     Heart Failure Father        Medications:  No current outpatient medications on file.       Physical Exam:   General:  Dutch is awake, alert, and cooperative.  NAD.  /69   Pulse 50   Temp 97.5  F (36.4  C) (Oral)   Resp 14   Wt 115.3 kg (254 lb 3.2 oz)   SpO2 96%   BMI 31.77 kg/m     HEENT: Grossly normal, trachea midline, EOM's intact  Respiratory: Regular, no distress, on room air.  Cardiac: Regular rate and rhythm  Abdomen: Soft, non-tender  Extremities: Warm, well perfused  Neuro: No focal deficits    Chest Tube Site: Heimlich valve removed and replaced with a new connector piece. No air leak after changing connections. Dressing around chest tube taken down and chest tube well secured.     Imaging:  Recent Results (from the past 48 hour(s))   Ribs XR, unilat 3 views + PA chest, right    Narrative    RIBS AND CHEST RIGHT THREE VIEWS December 13, 2022 12:33 PM     INDICATION: Right-sided rib pain after a fall.     COMPARISON: 11/29/2004.      Impression    IMPRESSION:  1.  Moderate sized right pneumothorax.  2.  No fracture.  3.  No airspace consolidation or pleural effusion.  4.  Sternotomy with aortic valve prosthesis.    Findings discussed with Dr. Cobb by phone at 12:51pm.    CASSANDRA SOTO MD         SYSTEM ID:  [id number]   Chest CT w IV contrast only, TRAUMA / DISSECTION    Narrative    CT CHEST W CONTRAST 12/13/2022 2:10 PM    CLINICAL HISTORY: eval for rib fx, known pneumo  TECHNIQUE: CT chest with IV contrast. Multiplanar reformats were  obtained. Dose reduction techniques were used.    CONTRAST: 80mL Isovue-370    COMPARISON: Same-day chest radiograph.    FINDINGS:   LUNGS AND PLEURA: Grossly unchanged size of known moderate  right  pneumothorax. Small peripheral contusions in the right lower and  middle lobes (for example series 4 images 104 and 163). No significant  pleural effusion. Left lung is clear. Incidental 3 mm nodule in the  right upper lobe (series 4 image 101).    MEDIASTINUM/AXILLAE: No lymphadenopathy. No thoracic aneurysm. Prior  aortic valve replacement. Possible minimal leftward mediastinal shift.      CORONARY ARTERY CALCIFICATION: Mild.    UPPER ABDOMEN: Cholelithiasis without evidence of acute cholecystitis.    MUSCULOSKELETAL: Likely nondisplaced fractures of the lateral right  fourth and fifth ribs. Healed left sixth rib fracture. Prior median  sternotomy.        Impression    IMPRESSION:   1.  Similar volume of moderate right pneumothorax with likely  nondisplaced fractures of the lateral right fourth and fifth ribs.  Minimal leftward mediastinal shift, could indicate element of  early/mild tension.  2.  Likely small peripheral contusions in the right upper and middle  lobes.  3.  Incidental 3 mm right upper lobe pulmonary nodule. Consider  follow-up described below.    REFERENCE:  Guidelines for Management of Incidental Pulmonary Nodules Detected on  CT Images: From the Fleischner Society 2017.   Guidelines apply to incidental nodules in patients who are 35 years or  older.  Guidelines do not apply to lung cancer screening, patients with  immunosuppression, or patients with known primary cancer.    SINGLE NODULE  Nodule size <6 mm  Low-risk patients: No follow-up needed.  High-risk patients: Optional follow-up at 12 months.    CHICHO TEJEDA MD         SYSTEM ID:  [id number]   XR Chest Port 1 View    Narrative    CHEST PORTABLE 1 VIEW   12/13/2022 2:48 PM     HISTORY: Evaluate CT.    COMPARISON: Chest CT on same day earlier.      Impression    IMPRESSION: AP view of the chest was obtained. Postsurgical changes of  cardiac surgery with median sternotomy wires and surgical clips.  Interval placement of right chest  tube with significant interval  decrease/almost complete resolution of the previously seen right  pneumothorax. No significant left pleural effusion or pneumothorax.    BRETT SOTO MD         SYSTEM ID:  [id number]   XR Chest Port 1 View    Narrative    EXAM: XR CHEST PORT 1 VIEW  LOCATION: [facility]  DATE/TIME: 12/14/2022 6:05 AM    INDICATION: Surveillance of pneumothorax.  COMPARISON: 12/13/2022.      Impression    IMPRESSION: Recurrence of small right pneumothorax with maximal apical pleural separation of 2.4 cm. Lungs otherwise clear. Stable mild cardiomegaly. Normal pulmonary vascularity. Poststernotomy and cardiac valve repair.       ASSESSMENT/PLAN:   This is a 67-year-old male with right tension pneumothorax s/p chest tube placed in the ED. I removed the Heimlich valve at the bedside and there is no air leak noted. Concern on the CXR this morning that the chest tube is pulled out of the pleural space. Will order a CT scan of the chest without contrast to evaluate. If the chest tube is pulled back, then will have IR replace. Patient understood and agreed with plan.      Discussed the above plan of care with patient and RN today. Orders left. We will continue to follow with you. Thank you for allowing us to participate in the care of this patient and please call if there are further questions or concerns.    Time dedicated to Consultation: 35 minutes were spent at bedside, floor and unit with greater than 50% of the time spent on patient counseling and education, radiology review and coordination of care.      Ruth Ann Bassett PA-C with Dr. Kiel [facility]  Office: [phone]  Cell: [phone]

## 2022-12-15 ENCOUNTER — APPOINTMENT (OUTPATIENT)
Dept: GENERAL RADIOLOGY | Facility: CLINIC | Age: 67
DRG: 200 | End: 2022-12-15
Attending: PHYSICIAN ASSISTANT
Payer: MEDICARE

## 2022-12-15 VITALS
WEIGHT: 254.9 LBS | RESPIRATION RATE: 18 BRPM | BODY MASS INDEX: 32.71 KG/M2 | HEART RATE: 90 BPM | TEMPERATURE: 98.4 F | HEIGHT: 74 IN | DIASTOLIC BLOOD PRESSURE: 71 MMHG | OXYGEN SATURATION: 95 % | SYSTOLIC BLOOD PRESSURE: 132 MMHG

## 2022-12-15 LAB
ANION GAP SERPL CALCULATED.3IONS-SCNC: 5 MMOL/L (ref 3–14)
BUN SERPL-MCNC: 17 MG/DL (ref 7–30)
CALCIUM SERPL-MCNC: 8.8 MG/DL (ref 8.5–10.1)
CHLORIDE BLD-SCNC: 105 MMOL/L (ref 94–109)
CO2 SERPL-SCNC: 23 MMOL/L (ref 20–32)
CREAT SERPL-MCNC: 0.96 MG/DL (ref 0.66–1.25)
GFR SERPL CREATININE-BSD FRML MDRD: 87 ML/MIN/1.73M2
GLUCOSE BLD-MCNC: 101 MG/DL (ref 70–99)
INR PPP: 1.9 (ref 0.85–1.15)
MAGNESIUM SERPL-MCNC: 2 MG/DL (ref 1.6–2.3)
PHOSPHATE SERPL-MCNC: 2.4 MG/DL (ref 2.5–4.5)
POTASSIUM BLD-SCNC: 5 MMOL/L (ref 3.4–5.3)
SODIUM SERPL-SCNC: 133 MMOL/L (ref 133–144)

## 2022-12-15 PROCEDURE — 80048 BASIC METABOLIC PNL TOTAL CA: CPT | Performed by: HOSPITALIST

## 2022-12-15 PROCEDURE — 94150 VITAL CAPACITY TEST: CPT

## 2022-12-15 PROCEDURE — 250N000011 HC RX IP 250 OP 636: Performed by: PHYSICIAN ASSISTANT

## 2022-12-15 PROCEDURE — 83735 ASSAY OF MAGNESIUM: CPT | Performed by: HOSPITALIST

## 2022-12-15 PROCEDURE — 36415 COLL VENOUS BLD VENIPUNCTURE: CPT | Performed by: PHYSICIAN ASSISTANT

## 2022-12-15 PROCEDURE — 85610 PROTHROMBIN TIME: CPT | Performed by: PHYSICIAN ASSISTANT

## 2022-12-15 PROCEDURE — 84100 ASSAY OF PHOSPHORUS: CPT | Performed by: HOSPITALIST

## 2022-12-15 PROCEDURE — 250N000013 HC RX MED GY IP 250 OP 250 PS 637: Performed by: PHYSICIAN ASSISTANT

## 2022-12-15 PROCEDURE — 999N000147 HC STATISTIC PT IP EVAL DEFER

## 2022-12-15 PROCEDURE — 250N000013 HC RX MED GY IP 250 OP 250 PS 637: Performed by: HOSPITALIST

## 2022-12-15 PROCEDURE — 999N000157 HC STATISTIC RCP TIME EA 10 MIN

## 2022-12-15 PROCEDURE — 71046 X-RAY EXAM CHEST 2 VIEWS: CPT

## 2022-12-15 PROCEDURE — 99239 HOSP IP/OBS DSCHRG MGMT >30: CPT | Performed by: STUDENT IN AN ORGANIZED HEALTH CARE EDUCATION/TRAINING PROGRAM

## 2022-12-15 RX ORDER — ENOXAPARIN SODIUM 100 MG/ML
1 INJECTION SUBCUTANEOUS 2 TIMES DAILY
Qty: 12 ML | Refills: 0 | Status: SHIPPED | OUTPATIENT
Start: 2022-12-15 | End: 2022-12-20

## 2022-12-15 RX ORDER — GABAPENTIN 300 MG/1
300 CAPSULE ORAL 3 TIMES DAILY
Qty: 21 CAPSULE | Refills: 0 | Status: SHIPPED | OUTPATIENT
Start: 2022-12-15 | End: 2022-12-22

## 2022-12-15 RX ORDER — WARFARIN SODIUM 5 MG/1
10 TABLET ORAL
Status: DISCONTINUED | OUTPATIENT
Start: 2022-12-15 | End: 2022-12-15 | Stop reason: HOSPADM

## 2022-12-15 RX ORDER — ACETAMINOPHEN 325 MG/1
325-650 TABLET ORAL EVERY 8 HOURS
Qty: 42 TABLET | Refills: 0 | Status: SHIPPED | OUTPATIENT
Start: 2022-12-15 | End: 2022-12-22

## 2022-12-15 RX ORDER — OXYCODONE HYDROCHLORIDE 5 MG/1
5 TABLET ORAL EVERY 4 HOURS PRN
Qty: 18 TABLET | Refills: 0 | Status: SHIPPED | OUTPATIENT
Start: 2022-12-15

## 2022-12-15 RX ADMIN — PANTOPRAZOLE SODIUM 40 MG: 40 TABLET, DELAYED RELEASE ORAL at 08:18

## 2022-12-15 RX ADMIN — ACETAMINOPHEN 975 MG: 325 TABLET, FILM COATED ORAL at 02:05

## 2022-12-15 RX ADMIN — DOCUSATE SODIUM 100 MG: 100 CAPSULE, LIQUID FILLED ORAL at 08:25

## 2022-12-15 RX ADMIN — ACETAMINOPHEN 975 MG: 325 TABLET, FILM COATED ORAL at 10:57

## 2022-12-15 RX ADMIN — ROSUVASTATIN CALCIUM 10 MG: 10 TABLET, FILM COATED ORAL at 08:18

## 2022-12-15 RX ADMIN — GABAPENTIN 100 MG: 100 CAPSULE ORAL at 08:18

## 2022-12-15 RX ADMIN — ENOXAPARIN SODIUM 120 MG: 120 INJECTION SUBCUTANEOUS at 05:49

## 2022-12-15 ASSESSMENT — ACTIVITIES OF DAILY LIVING (ADL)
ADLS_ACUITY_SCORE: 35

## 2022-12-15 NOTE — DISCHARGE SUMMARY
[facility]  Hospitalist Discharge Summary      Date of Admission:  12/13/2022  Date of Discharge:  12/15/2022  Discharging Provider: Delbert Brown MD  Discharge Service: Hospitalist Service    Discharge Diagnoses     Traumatic right pneumothorax  Nondisplaced fractures of the lateral right fourth and fifth ribs    Follow-ups Needed After Discharge   Follow-up Appointments     Follow-up and recommended labs and tests       Follow up with primary care provider, Steve Parnell, within 7 days for   hospital follow- up.  Check INR             Unresulted Labs Ordered in the Past 30 Days of this Admission     No orders found from 11/13/2022 to 12/14/2022.        Discharge Disposition   Discharged to home  Condition at discharge: Stable    Hospital Course   Dutch Mary is a 67 year old male with a past medical history significant for mechanical aortic valve replacement, HTN, HLD, and aortic dissection admitted on 12/13/2022 after presenting with chest pain and shortness of breath following an injury while playing pickle ball.     Traumatic right pneumothorax  Nondisplaced fractures of the lateral right fourth and fifth ribs  Right upper and middle lobe lung contusions  Signs of minimal leftward mediastinal shift indicating potentially mild tension on CT prompted chest tube placement in the emergency department after consultation with  thoracic surgery  Chest tube placed in the ED  * CXR 12/14 raises some concern about chest tube placement  - Continue chest tube to wall suction  - Thoracic surgery following, appreciate assistance  -- CT removed 12/15  - Pain control per rib fracture order set including gabapentin, Tylenol, oxycodone.   --aggressive IS, pulmonary hygiene      Mechanical aortic valve replacement  Hx aortic dissection    INR on admission 1.97, 2.0 12/14.   * goal INR per outside records of 2.5-3.5  - Resume warfarin  - Bridge with therapuetic lovenox until INR therapeutic     3  mm right upper lobe pulmonary nodule  - Refer to PCP for follow-up     Chronic medical conditions  GERD  Hyperlipidemia  Hypertension  Plan  - Resume PTA statin, PPI  -- Resume PTA lisinopril     Consultations This Hospital Stay   THORACIC SURGERY IP CONSULT  PHYSICAL THERAPY ADULT IP CONSULT  PHARMACY TO DOSE WARFARIN  OCCUPATIONAL THERAPY ADULT IP CONSULT    Code Status   Full Code    Time Spent on this Encounter   I, Delbert Brown MD, personally saw the patient today and spent greater than 30 minutes discharging this patient.       Delbert Brown MD  [address] [place] [address]  Phone: [phone]  ______________________________________________________________________    Physical Exam   Vital Signs: Temp: 98.4  F (36.9  C) Temp src: Oral BP: 132/71 Pulse: 90   Resp: 18 SpO2: 95 % O2 Device: None (Room air)    Weight: 254 lbs 14.4 oz  ----------------------------------------------------------------------------------------       Primary Care Physician   Steve Parnell    Discharge Orders      Reason for your hospital stay    You had chest pain and shortness of breath following an injury while playing pickle ball     Activity    Your activity upon discharge: activity as tolerated     Follow-up and recommended labs and tests     Follow up with primary care provider, Steve Parnell, within 7 days for hospital follow- up.  Check INR     Diet    Follow this diet upon discharge: Orders Placed This Encounter      Combination Diet Regular Diet Adult       Significant Results and Procedures   Most Recent 3 CBC's:  Recent Labs   Lab Test 12/14/22  0535 12/13/22  1447   WBC 6.6 10.8   HGB 13.6 14.6   MCV 92 93    173     Most Recent 3 BMP's:  Recent Labs   Lab Test 12/15/22  0547 12/14/22  0535 12/13/22  2348 12/13/22  1447    137  --  136   POTASSIUM 5.0 3.6  --  4.1   CHLORIDE 105 105  --  104   CO2 23 26  --  27   BUN 17 24  --  20   CR 0.96 1.04  --   1.22   ANIONGAP 5 6  --  5   TANNER 8.8 8.7  --  9.2   * 113* 139* 118*     Most Recent 2 LFT's:  Recent Labs   Lab Test 12/14/22  0535   AST 26   ALT 40   ALKPHOS 71   BILITOTAL 1.2     Most Recent 3 INR's:  Recent Labs   Lab Test 12/15/22  0547 12/14/22  1240 12/13/22  1447   INR 1.90* 2.09* 1.97*     Most Recent 3 Troponin's:No lab results found.  Most Recent 3 BNP's:No lab results found.,   Results for orders placed or performed during the hospital encounter of 12/13/22   Ribs XR, unilat 3 views + PA chest, right    Narrative    RIBS AND CHEST RIGHT THREE VIEWS December 13, 2022 12:33 PM     INDICATION: Right-sided rib pain after a fall.     COMPARISON: 11/29/2004.      Impression    IMPRESSION:  1.  Moderate sized right pneumothorax.  2.  No fracture.  3.  No airspace consolidation or pleural effusion.  4.  Sternotomy with aortic valve prosthesis.    Findings discussed with Dr. Cobb by phone at 12:51pm.    CASSANDRA SOTO MD         SYSTEM ID:  [id number]   Chest CT w IV contrast only, TRAUMA / DISSECTION    Narrative    CT CHEST W CONTRAST 12/13/2022 2:10 PM    CLINICAL HISTORY: eval for rib fx, known pneumo  TECHNIQUE: CT chest with IV contrast. Multiplanar reformats were  obtained. Dose reduction techniques were used.    CONTRAST: 80mL Isovue-[id number]    COMPARISON: Same-day chest radiograph.    FINDINGS:   LUNGS AND PLEURA: Grossly unchanged size of known moderate right  pneumothorax. Small peripheral contusions in the right lower and  middle lobes (for example series 4 images 104 and 163). No significant  pleural effusion. Left lung is clear. Incidental 3 mm nodule in the  right upper lobe (series 4 image 101).    MEDIASTINUM/AXILLAE: No lymphadenopathy. No thoracic aneurysm. Prior  aortic valve replacement. Possible minimal leftward mediastinal shift.      CORONARY ARTERY CALCIFICATION: Mild.    UPPER ABDOMEN: Cholelithiasis without evidence of acute cholecystitis.    MUSCULOSKELETAL: Likely nondisplaced  fractures of the lateral right  fourth and fifth ribs. Healed left sixth rib fracture. Prior median  sternotomy.        Impression    IMPRESSION:   1.  Similar volume of moderate right pneumothorax with likely  nondisplaced fractures of the lateral right fourth and fifth ribs.  Minimal leftward mediastinal shift, could indicate element of  early/mild tension.  2.  Likely small peripheral contusions in the right upper and middle  lobes.  3.  Incidental 3 mm right upper lobe pulmonary nodule. Consider  follow-up described below.    REFERENCE:  Guidelines for Management of Incidental Pulmonary Nodules Detected on  CT Images: From the Fleischner Society 2017.   Guidelines apply to incidental nodules in patients who are 35 years or  older.  Guidelines do not apply to lung cancer screening, patients with  immunosuppression, or patients with known primary cancer.    SINGLE NODULE  Nodule size <6 mm  Low-risk patients: No follow-up needed.  High-risk patients: Optional follow-up at 12 months.    CHICHO TEJEDA MD         SYSTEM ID:  [id number]   XR Chest Port 1 View    Narrative    CHEST PORTABLE 1 VIEW   12/13/2022 2:48 PM     HISTORY: Evaluate CT.    COMPARISON: Chest CT on same day earlier.      Impression    IMPRESSION: AP view of the chest was obtained. Postsurgical changes of  cardiac surgery with median sternotomy wires and surgical clips.  Interval placement of right chest tube with significant interval  decrease/almost complete resolution of the previously seen right  pneumothorax. No significant left pleural effusion or pneumothorax.    BRETT SOTO MD         SYSTEM ID:  [id number]   XR Chest Port 1 View    Narrative    EXAM: XR CHEST PORT 1 VIEW  LOCATION: [facility]  DATE/TIME: 12/14/2022 6:05 AM    INDICATION: Surveillance of pneumothorax.  COMPARISON: 12/13/2022.      Impression    IMPRESSION: Recurrence of small right pneumothorax with maximal apical pleural separation of 2.4  cm. Lungs otherwise clear. Stable mild cardiomegaly. Normal pulmonary vascularity. Poststernotomy and cardiac valve repair.   CT Chest w/o Contrast    Narrative    CT CHEST W/O CONTRAST 12/14/2022 3:23 PM    CLINICAL HISTORY: right tension pneumothorax s/p chest tube placement  in ED, concern tube has fallen out    TECHNIQUE: CT chest without IV contrast. Multiplanar reformats were  obtained. Dose reduction techniques were used.  CONTRAST: None.    COMPARISON: Same day chest radiograph, CT 12/13/2022    FINDINGS:   LUNGS AND PLEURA: Small recurrent right pneumothorax without evidence  of tension. There has been retraction of the right chest tube, tip now  within the anterior chest wall. Evolving contusions in the right lung  with small nodules, described on CT from 12/13/2022. Left lung is  clear. No significant pleural effusion.    MEDIASTINUM/AXILLAE: No lymphadenopathy. No thoracic aortic aneurysm.  Prior aortic valve replacement.    CORONARY ARTERY CALCIFICATION: Moderate.    UPPER ABDOMEN: Cholelithiasis again noted. No acute abnormality.    MUSCULOSKELETAL: Minimally displaced right rib fractures again noted.       Impression    IMPRESSION:   1.  Retraction of right chest tube outside of the pleural space, tip  now within the anterior chest wall.  2.  Recurrent small right pneumothorax without evidence of tension.  3.  Continued evolution of pulmonary contusions in the right upper and  middle lobes.  4.  Small pulmonary nodules, largest measuring 3 mm in the right upper  lobe. Again, recommend follow-up described below.    REFERENCE:  Guidelines for Management of Incidental Pulmonary Nodules Detected on  CT Images: From the Fleischner Society 2017.   Guidelines apply to incidental nodules in patients who are 35 years or  older.  Guidelines do not apply to lung cancer screening, patients with  immunosuppression, or patients with known primary cancer.    MULTIPLE NODULES  Nodule size <6 mm  Low-risk patients: No  follow-up needed.  High-risk patients: Optional follow-up at 12 months.    CHICHO TEJEDA MD         SYSTEM ID:  [id number]   XR Chest 2 Views    Narrative    EXAM: XR CHEST 2 VIEWS  LOCATION: [facility]  DATE/TIME: 12/15/2022 6:05 AM    INDICATION: right pneumothorax  COMPARISON: CTA and radiograph of the chest 12/13/2022 and 12/14/2022.      Impression    IMPRESSION: Persistent small right pneumothorax with maximal apical pleural separation of 2 cm. Mild right basilar atelectasis. Left lung clear. Borderline cardiomegaly. Poststernotomy and cardiac valve repair. Normal pulmonary vascularity.       Discharge Medications   Current Discharge Medication List      START taking these medications    Details   acetaminophen (TYLENOL) 325 MG tablet Take 1-2 tablets (325-650 mg) by mouth every 8 hours for 7 days  Qty: 42 tablet, Refills: 0    Associated Diagnoses: Closed fracture of multiple ribs of right side, initial encounter      enoxaparin ANTICOAGULANT (LOVENOX) 40 MG/0.4ML syringe Inject 1.2 mLs (120 mg) Subcutaneous 2 times daily for 5 days  Qty: 12 mL, Refills: 0    Associated Diagnoses: H/O mechanical aortic valve replacement      gabapentin (NEURONTIN) 300 MG capsule Take 1 capsule (300 mg) by mouth 3 times daily for 7 days  Qty: 21 capsule, Refills: 0    Associated Diagnoses: Closed fracture of multiple ribs of right side, initial encounter      oxyCODONE (ROXICODONE) 5 MG tablet Take 1 tablet (5 mg) by mouth every 4 hours as needed for moderate pain (4-6)  Qty: 18 tablet, Refills: 0    Associated Diagnoses: Closed fracture of multiple ribs of right side, initial encounter         CONTINUE these medications which have NOT CHANGED    Details   lisinopril (ZESTRIL) 20 MG tablet Take 20 mg by mouth daily      Omeprazole Magnesium (PRILOSEC OTC PO) Take 20 mg by mouth daily      rosuvastatin (CRESTOR) 10 MG tablet Take 10 mg by mouth daily      WARFARIN SODIUM PO Take by mouth See Admin  Instructions 10mg daily except 15mg on Tues and Sat      Tadalafil (CIALIS PO) Take 20 mg by mouth daily as needed for erectile dysfunction           Allergies   No Known Allergies

## 2022-12-15 NOTE — CARE PLAN
PT consult for rib fx. RN denies mobility concerns and reports pt IND. Met with pt at bedside. Pt denies mobility concerns, reports was already educated on splinting with coughing, using IS. No PT needs identified. Will sign off.

## 2022-12-15 NOTE — DISCHARGE INSTRUCTIONS
Thoracic Surgery Follow Up After Hospital Discharge    You already have a follow up chest x-ray and appointment scheduled as follows:  Go to [facility] ([address] [place] [address], [address], [place] [address]) on Monday, December 19th at 2:25 PM to check in for a 2:40 PM chest x-ray.  Then go upstairs to the [facility] office in [address] at 3:00 PM the same day for your follow up appointment. You will see either Ruth Ann Bassett PA-C or Dr. Hawkins for your appointment.     If you need to call to schedule your chest x-ray and appointment: Chanel [phone]      Wound Care:   *Please look at your chest tube site daily and keep clean during healing   *Do not apply creams, salves such as Bacitracin, or ointments on the wound   *Remove the dressing covering your chest tube site the evening of 12/15 after your discharge from the hospital. You may then shower. No bathing/immersing incisions under water for two weeks or until the chest tube site is completely healed.   *After your shower, pat the chest tube site dry and place a dry gauze dressing (and tape) over the site. It is normal to have some drainage from the sites for a few days. Do not be alarmed if a large amount of fluid drains (should be pink or yellow) either spontaneously or with coughing or exertion. Should this happen, just place a larger, thicker gauze dressing over the chest tube sites. In about a week, drainage should stop and a scab will form. Once drainage stops, you can stop covering the sites. Call our office if the drainage is milky or green in color or foul-smelling.    Respiratory Care:  Utilize the incentive spirometer several times in a row every few hours while awake for a few weeks after discharging home from the hospital.

## 2022-12-15 NOTE — PROGRESS NOTES
"THORACIC SURGERY    S: Pain better today, breathing is stable/improving. Feels okay to discharge to home today.    O: /71 (BP Location: Left arm)   Pulse 90   Temp 98.4  F (36.9  C) (Oral)   Resp 18   Ht 1.88 m (6' 2\")   Wt 115.6 kg (254 lb 14.4 oz)   SpO2 95%   BMI 32.73 kg/m    Gen: Sitting up in bed, alert  Resp: Regular, on room air  Skin: Dressing over prior chest tube site intact.    CXR: Small residual apical right ptx, stable    Plan:  - Okay to discharge to home today from our perspective  - Will arrange follow up CXR and appt in clinic for 12/19  - Okay to resume therapeutic anticoagulation    Ruth Ann Bassett PA-C with Dr. Kiel [facility] Thoracic Surgery  Office: [phone]  Cell: [phone]    "

## 2022-12-15 NOTE — PLAN OF CARE
Patient is A&Ox 4. Vitals are stable on room air. Tele is sinus bradycardia. Patient slept through night. Pain managed with oxycodone. Patient is up independently. No BM on shift, urine output adequate. Patient went for xray at 0600. Lovenox given. Plan is discharge today.

## 2022-12-15 NOTE — PLAN OF CARE
[id number]  Pt alert and oriented x4. VSS on RA. Pain rated 2/10. Scheduled tylenol given. Tele SR. Reg diet. Continent B/B. No BM since 12/13, requested colace, given x1. IND in the room. Discharge home. Instructions given to pt along with meds and script. Instructions given regarding CT site dressing and administering Lovenox. Supplies given. Plan to have CXR and follow-up appt on 12/19.     Update: 12:30pm Lovenox was not coming up for another 20 minutes, pt refused to wait and wanted to be walked down to the door. Pt received all meds except for the Lovenox. Educated on implications of not obtaining Lovenox.

## 2022-12-16 LAB
ATRIAL RATE - MUSE: 46 BPM
DIASTOLIC BLOOD PRESSURE - MUSE: NORMAL MMHG
INTERPRETATION ECG - MUSE: NORMAL
P AXIS - MUSE: 35 DEGREES
PR INTERVAL - MUSE: 170 MS
QRS DURATION - MUSE: 98 MS
QT - MUSE: 500 MS
QTC - MUSE: 437 MS
R AXIS - MUSE: 44 DEGREES
SYSTOLIC BLOOD PRESSURE - MUSE: NORMAL MMHG
T AXIS - MUSE: 71 DEGREES
VENTRICULAR RATE- MUSE: 46 BPM

## 2022-12-19 ENCOUNTER — ANCILLARY PROCEDURE (OUTPATIENT)
Dept: GENERAL RADIOLOGY | Facility: CLINIC | Age: 67
End: 2022-12-19
Attending: THORACIC SURGERY (CARDIOTHORACIC VASCULAR SURGERY)
Payer: MEDICARE

## 2022-12-19 PROCEDURE — 71046 X-RAY EXAM CHEST 2 VIEWS: CPT

## 2024-03-08 ENCOUNTER — MEDICAL CORRESPONDENCE (OUTPATIENT)
Dept: SCHEDULING | Facility: CLINIC | Age: 69
End: 2024-03-08
Payer: MEDICARE

## 2024-03-18 ENCOUNTER — ANCILLARY PROCEDURE (OUTPATIENT)
Dept: CT IMAGING | Facility: CLINIC | Age: 69
End: 2024-03-18
Attending: INTERNAL MEDICINE
Payer: MEDICARE

## 2024-03-18 DIAGNOSIS — R94.2 ABNORMAL RESULTS OF PULMONARY FUNCTION STUDIES: ICD-10-CM

## 2024-03-18 DIAGNOSIS — S27.0XXD TRAUMATIC PNEUMOTHORAX, SUBSEQUENT ENCOUNTER: ICD-10-CM

## 2024-03-18 DIAGNOSIS — R06.00 DYSPNEA, UNSPECIFIED: ICD-10-CM

## 2024-03-18 PROCEDURE — 71250 CT THORAX DX C-: CPT

## 2024-05-25 ENCOUNTER — HEALTH MAINTENANCE LETTER (OUTPATIENT)
Age: 69
End: 2024-05-25